# Patient Record
(demographics unavailable — no encounter records)

---

## 2025-01-03 NOTE — PHYSICAL EXAM
[No Acute Distress] : no acute distress [Well Nourished] : well nourished [Well Developed] : well developed [Well-Appearing] : well-appearing [Supple] : supple [No Respiratory Distress] : no respiratory distress  [No Accessory Muscle Use] : no accessory muscle use [Clear to Auscultation] : lungs were clear to auscultation bilaterally [Normal Rate] : normal rate  [Regular Rhythm] : with a regular rhythm [Normal S1, S2] : normal S1 and S2 [No Murmur] : no murmur heard [No Edema] : there was no peripheral edema [Normal Gait] : normal gait [Normal Affect] : the affect was normal [de-identified] : left mid-back with 1-2 cm area of firmness under skin with mild elevation and slight peeling of skin, without erythema or warmth, on palpation was able to express small amount of white pus from central region and thicker white cheesy material more medially from indurated area

## 2025-01-03 NOTE — HISTORY OF PRESENT ILLNESS
[FreeTextEntry1] : f/up visit [de-identified] : 67 yo female with h/o as below including HTN and preDM here for f/up visit. Trying to walk almost every day, at least 3-4 times/week, trying to watch what she eats.  Difficulty losing weight. Breakfast oatmeal or toast, fruits, lunch salads, doesn't have much meat or chicken, dinner beans, rice, potatoes, during the day will snack on chips, cookies.   Checks bp on occasion, usually 130/70.   Has boil on left lower back, did drain pus at one point but still may have some fluid in it.  Wondering if needs antibiotics. Got flu shot this year and covid booster. Takes famotidine on occasion, sometimes has SOB when talking and famotidine will help.  Not wheezing.  No longer on inhaler. Doesn't get SOB with exertion.  Has had SOB many times before, comes with winter and then will go away.

## 2025-01-03 NOTE — ASSESSMENT
[FreeTextEntry1] : 69 yo female with h/o as above including HTN, preDM, obesity here for f/up visit. 1.  CV - bp at goal, c/w current regimen 2  Endo - on metformin for preDM; counseled on diet changes, suggested weight management team to help with obesity, insurance didn't cover glp1 previously 3.  Derm - suspect cyst left-mid back (possibly recent inflamed), now reportedly much improved, would avoid neosporin and suggested warm soaks to continue to promote drainage, if worsening symptoms to call back but would hold on abx for now as no evidence of infection; referred to plastic surgeon for possible resection 4.  RTO 6 months cpe

## 2025-01-03 NOTE — PHYSICAL EXAM
[No Acute Distress] : no acute distress [Well Nourished] : well nourished [Well Developed] : well developed [Well-Appearing] : well-appearing [Supple] : supple [No Respiratory Distress] : no respiratory distress  [No Accessory Muscle Use] : no accessory muscle use [Clear to Auscultation] : lungs were clear to auscultation bilaterally [Normal Rate] : normal rate  [Regular Rhythm] : with a regular rhythm [Normal S1, S2] : normal S1 and S2 [No Murmur] : no murmur heard [No Edema] : there was no peripheral edema [Normal Gait] : normal gait [Normal Affect] : the affect was normal [de-identified] : left mid-back with 1-2 cm area of firmness under skin with mild elevation and slight peeling of skin, without erythema or warmth, on palpation was able to express small amount of white pus from central region and thicker white cheesy material more medially from indurated area

## 2025-01-03 NOTE — ASSESSMENT
[FreeTextEntry1] : 67 yo female with h/o as above including HTN, preDM, obesity here for f/up visit. 1.  CV - bp at goal, c/w current regimen 2  Endo - on metformin for preDM; counseled on diet changes, suggested weight management team to help with obesity, insurance didn't cover glp1 previously 3.  Derm - suspect cyst left-mid back (possibly recent inflamed), now reportedly much improved, would avoid neosporin and suggested warm soaks to continue to promote drainage, if worsening symptoms to call back but would hold on abx for now as no evidence of infection; referred to plastic surgeon for possible resection 4.  RTO 6 months cpe

## 2025-01-03 NOTE — HISTORY OF PRESENT ILLNESS
[FreeTextEntry1] : f/up visit [de-identified] : 69 yo female with h/o as below including HTN and preDM here for f/up visit. Trying to walk almost every day, at least 3-4 times/week, trying to watch what she eats.  Difficulty losing weight. Breakfast oatmeal or toast, fruits, lunch salads, doesn't have much meat or chicken, dinner beans, rice, potatoes, during the day will snack on chips, cookies.   Checks bp on occasion, usually 130/70.   Has boil on left lower back, did drain pus at one point but still may have some fluid in it.  Wondering if needs antibiotics. Got flu shot this year and covid booster. Takes famotidine on occasion, sometimes has SOB when talking and famotidine will help.  Not wheezing.  No longer on inhaler. Doesn't get SOB with exertion.  Has had SOB many times before, comes with winter and then will go away.

## 2025-07-05 NOTE — PHYSICAL EXAM
[No Acute Distress] : no acute distress [Well Nourished] : well nourished [Well Developed] : well developed [Well-Appearing] : well-appearing [PERRL] : pupils equal round and reactive to light [Normal Oropharynx] : the oropharynx was normal [EOMI] : extraocular movements intact [Normal TMs] : both tympanic membranes were normal [No Lymphadenopathy] : no lymphadenopathy [Supple] : supple [Thyroid Normal, No Nodules] : the thyroid was normal and there were no nodules present [No Respiratory Distress] : no respiratory distress  [No Accessory Muscle Use] : no accessory muscle use [Clear to Auscultation] : lungs were clear to auscultation bilaterally [Normal Rate] : normal rate  [Regular Rhythm] : with a regular rhythm [Normal S1, S2] : normal S1 and S2 [No Murmur] : no murmur heard [No Carotid Bruits] : no carotid bruits [Pedal Pulses Present] : the pedal pulses are present [No Edema] : there was no peripheral edema [Normal Appearance] : normal in appearance [No Nipple Discharge] : no nipple discharge [No Axillary Lymphadenopathy] : no axillary lymphadenopathy [Soft] : abdomen soft [Non Tender] : non-tender [Non-distended] : non-distended [No Masses] : no abdominal mass palpated [No HSM] : no HSM [Normal Bowel Sounds] : normal bowel sounds [Normal Supraclavicular Nodes] : no supraclavicular lymphadenopathy [Normal Axillary Nodes] : no axillary lymphadenopathy [Normal Posterior Cervical Nodes] : no posterior cervical lymphadenopathy [Normal Anterior Cervical Nodes] : no anterior cervical lymphadenopathy [Normal Inguinal Nodes] : no inguinal lymphadenopathy [No Joint Swelling] : no joint swelling [No Rash] : no rash [Normal Gait] : normal gait [Normal Affect] : the affect was normal

## 2025-07-10 NOTE — HEALTH RISK ASSESSMENT
[Never] : Never [Patient reported mammogram was normal] : Patient reported mammogram was normal [Patient reported PAP Smear was normal] : Patient reported PAP Smear was normal [Patient reported bone density results were normal] : Patient reported bone density results were normal [Patient reported colonoscopy was normal] : Patient reported colonoscopy was normal [With Family] : lives with family [] :  [Fully functional (bathing, dressing, toileting, transferring, walking, feeding)] : Fully functional (bathing, dressing, toileting, transferring, walking, feeding) [Fully functional (using the telephone, shopping, preparing meals, housekeeping, doing laundry, using] : Fully functional and needs no help or supervision to perform IADLs (using the telephone, shopping, preparing meals, housekeeping, doing laundry, using transportation, managing medications and managing finances) [Smoke Detector] : smoke detector [Carbon Monoxide Detector] : carbon monoxide detector [Designated Healthcare Proxy] : Designated healthcare proxy [Relationship: ___] : Relationship: [unfilled] [Monthly or less (1 pt)] : Monthly or less (1 point) [1 or 2 (0 pts)] : 1 or 2 (0 points) [Never (0 pts)] : Never (0 points) [No] : In the past 12 months have you used drugs other than those required for medical reasons? No [No falls in past year] : Patient reported no falls in the past year [0] : 2) Feeling down, depressed, or hopeless: Not at all (0) [PHQ-2 Negative - No further assessment needed] : PHQ-2 Negative - No further assessment needed [None] : Patient does not have any barriers to medication adherence [Yes] : Reviewed medication list for presence of high-risk medications. 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

## 2025-07-10 NOTE — ASSESSMENT
[Vaccines Reviewed] : Immunizations reviewed today. Please see immunization details in the vaccine log within the immunization flowsheet.  [FreeTextEntry1] : 70 yo female with h/o as above including HTN, preDM, MAFLD, obesity, thyroid nodule, GERD here for CPE, concern for UTI, and for [facility] visit for cataract surgery. 1.  Preop - pt with no active cardiac conditions, good exercise tolerance without symptoms, nl ECG, RCRI 0, pt low risk candidate for low-risk procedure with no further w/up required prior to planned surgery and no contraindication to proceeding with planned surgery 2.   - UA in office +, will start macrobid empirically and send out ua/ucx 3.  CV - bp slightly elevated but improved over course of visit and acceptable but will monitor at future visits (previously under good control); check lipids 4.  Endo - rx thyroid US given to f/up nodule; dexa utd; check a1c and b12 on metformin (for preDM) 5.  GI - colonoscopy utd; MAFLD, will calculate fib-4 score when results come back, check hep A and B immunity 6.  Gyn - pap utd, rx mammo given as due 7.  HCM - check below labs; pt reports had pneumonia vaccine age 65 so she will find out which one she had; vaccines otherwise utd 8.  RTO 6 months f/up visit

## 2025-07-10 NOTE — REVIEW OF SYSTEMS
[Negative] : ENT [Fever] : no fever [Chills] : no chills [Fatigue] : no fatigue [Recent Change In Weight] : ~T no recent weight change [Chest Pain] : no chest pain [Palpitations] : no palpitations [Shortness Of Breath] : no shortness of breath [Cough] : no cough [Dyspnea on Exertion] : no dyspnea on exertion [Abdominal Pain] : no abdominal pain [Nausea] : no nausea [Constipation] : no constipation [Diarrhea] : diarrhea [Vomiting] : no vomiting [Heartburn] : no heartburn [Melena] : no melena [Vaginal Discharge] : no vaginal discharge [Skin Rash] : no skin rash [Headache] : no headache [Dizziness] : no dizziness [Fainting] : no fainting [Anxiety] : no anxiety [Depression] : no depression [Easy Bleeding] : no easy bleeding [Easy Bruising] : no easy bruising [FreeTextEntry2] : diet ok, not exercising as much [FreeTextEntry3] : see hpi [FreeTextEntry5] : can climb full flight of stairs without chest pain or shortness of breath, can walk up a hill but slight windedness (stable) [FreeTextEntry8] : see hpi [FreeTextEntry9] : see hpi [de-identified] : under control, manageable

## 2025-07-10 NOTE — HISTORY OF PRESENT ILLNESS
[FreeTextEntry1] : physical [de-identified] : 68 yo female with h/o as below here for CPE.  Also here for [facility] visit for cataract surgery. Developed right hip pain, wondering if should go to ortho, when walking will feel it. Had UTI for 3 weeks, tried taking azo, cranberry tablets, didn't get tested, has cloudy urine and foul odor and at end of voiding has a little pain. Otherwise feeling well.  Had noticed FSG was elevated to 130s fasting and up to 150s after meals so self-increased metformin to 2 tabs/day of 500 mg and FSG improved. No chest pain, no palpitations, no shortness of breath, no dizziness, no syncopal episodes.  Can climb a flight of stairs without chest pain or shortness of breath.  No bleeding or clotting disorders. Never had complications with anesthesia in the past.

## 2025-07-10 NOTE — ADDENDUM
[FreeTextEntry1] : 7/10/24:  Reviewed labs, nl except Ucx >100,000 Ecoli sensitive to macrobid (which pt was given), triglycerides 244 elevated to work on diet, a1c 5.8 slightly improved to work on diet/exercise, hep A nonimmune to consider vaccine, LDL acceptable, other labs nl, fib4 0.74 so can observe for now - mailed/messaged to pt.  Pt may proceed with planned surgery as above.

## 2025-07-10 NOTE — REVIEW OF SYSTEMS
[Negative] : ENT [Fever] : no fever [Chills] : no chills [Fatigue] : no fatigue [Recent Change In Weight] : ~T no recent weight change [Chest Pain] : no chest pain [Palpitations] : no palpitations [Shortness Of Breath] : no shortness of breath [Cough] : no cough [Dyspnea on Exertion] : no dyspnea on exertion [Abdominal Pain] : no abdominal pain [Nausea] : no nausea [Constipation] : no constipation [Diarrhea] : diarrhea [Vomiting] : no vomiting [Heartburn] : no heartburn [Melena] : no melena [Vaginal Discharge] : no vaginal discharge [Skin Rash] : no skin rash [Headache] : no headache [Dizziness] : no dizziness [Fainting] : no fainting [Anxiety] : no anxiety [Depression] : no depression [Easy Bleeding] : no easy bleeding [Easy Bruising] : no easy bruising [FreeTextEntry2] : diet ok, not exercising as much [FreeTextEntry3] : see hpi [FreeTextEntry5] : can climb full flight of stairs without chest pain or shortness of breath, can walk up a hill but slight windedness (stable) [FreeTextEntry8] : see hpi [FreeTextEntry9] : see hpi [de-identified] : under control, manageable

## 2025-07-10 NOTE — REVIEW OF SYSTEMS
[Negative] : ENT [Fever] : no fever [Chills] : no chills [Fatigue] : no fatigue [Recent Change In Weight] : ~T no recent weight change [Chest Pain] : no chest pain [Palpitations] : no palpitations [Shortness Of Breath] : no shortness of breath [Cough] : no cough [Dyspnea on Exertion] : no dyspnea on exertion [Abdominal Pain] : no abdominal pain [Nausea] : no nausea [Constipation] : no constipation [Diarrhea] : diarrhea [Vomiting] : no vomiting [Heartburn] : no heartburn [Melena] : no melena [Vaginal Discharge] : no vaginal discharge [Skin Rash] : no skin rash [Headache] : no headache [Dizziness] : no dizziness [Fainting] : no fainting [Anxiety] : no anxiety [Depression] : no depression [Easy Bleeding] : no easy bleeding [Easy Bruising] : no easy bruising [FreeTextEntry2] : diet ok, not exercising as much [FreeTextEntry3] : see hpi [FreeTextEntry5] : can climb full flight of stairs without chest pain or shortness of breath, can walk up a hill but slight windedness (stable) [FreeTextEntry8] : see hpi [FreeTextEntry9] : see hpi [de-identified] : under control, manageable

## 2025-07-10 NOTE — PAST MEDICAL HISTORY
[Postmenopausal] : history of menopause having occurred [Menopause Age____] : age at menopause was [unfilled] [Total Preg ___] : G: [unfilled] [Full Term ___] : Full Term: [unfilled]  [Premature ___] : Premature: [unfilled] [FreeTextEntry1] : no vaginal bleeding, no uterine or ovarian abnl, no abnl paps, no concerns for STIs

## 2025-07-10 NOTE — ASSESSMENT
[Vaccines Reviewed] : Immunizations reviewed today. Please see immunization details in the vaccine log within the immunization flowsheet.  [FreeTextEntry1] : 70 yo female with h/o as above including HTN, preDM, MAFLD, obesity, thyroid nodule, GERD here for CPE, concern for UTI, and for [facility] visit for cataract surgery. 1.  Preop - pt with no active cardiac conditions, good exercise tolerance without symptoms, nl ECG, RCRI 0, pt low risk candidate for low-risk procedure with no further w/up required prior to planned surgery and no contraindication to proceeding with planned surgery 2.   - UA in office +, will start macrobid empirically and send out ua/ucx 3.  CV - bp slightly elevated but improved over course of visit and acceptable but will monitor at future visits (previously under good control); check lipids 4.  Endo - rx thyroid US given to f/up nodule; dexa utd; check a1c and b12 on metformin (for preDM) 5.  GI - colonoscopy utd; MAFLD, will calculate fib-4 score when results come back, check hep A and B immunity 6.  Gyn - pap utd, rx mammo given as due 7.  HCM - check below labs; pt reports had pneumonia vaccine age 65 so she will find out which one she had; vaccines otherwise utd 8.  RTO 6 months f/up visit

## 2025-07-10 NOTE — HEALTH RISK ASSESSMENT
[Never] : Never [Patient reported mammogram was normal] : Patient reported mammogram was normal [Patient reported PAP Smear was normal] : Patient reported PAP Smear was normal [Patient reported bone density results were normal] : Patient reported bone density results were normal [Patient reported colonoscopy was normal] : Patient reported colonoscopy was normal [With Family] : lives with family [] :  [Fully functional (bathing, dressing, toileting, transferring, walking, feeding)] : Fully functional (bathing, dressing, toileting, transferring, walking, feeding) [Fully functional (using the telephone, shopping, preparing meals, housekeeping, doing laundry, using] : Fully functional and needs no help or supervision to perform IADLs (using the telephone, shopping, preparing meals, housekeeping, doing laundry, using transportation, managing medications and managing finances) [Smoke Detector] : smoke detector [Carbon Monoxide Detector] : carbon monoxide detector [Designated Healthcare Proxy] : Designated healthcare proxy [Relationship: ___] : Relationship: [unfilled] [Monthly or less (1 pt)] : Monthly or less (1 point) [1 or 2 (0 pts)] : 1 or 2 (0 points) [Never (0 pts)] : Never (0 points) [No] : In the past 12 months have you used drugs other than those required for medical reasons? No [No falls in past year] : Patient reported no falls in the past year [0] : 2) Feeling down, depressed, or hopeless: Not at all (0) [PHQ-2 Negative - No further assessment needed] : PHQ-2 Negative - No further assessment needed [None] : Patient does not have any barriers to medication adherence [Yes] : Reviewed medication list for presence of high-risk medications. 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

## 2025-07-10 NOTE — HISTORY OF PRESENT ILLNESS
[FreeTextEntry1] : physical [de-identified] : 70 yo female with h/o as below here for CPE.  Also here for [facility] visit for cataract surgery. Developed right hip pain, wondering if should go to ortho, when walking will feel it. Had UTI for 3 weeks, tried taking azo, cranberry tablets, didn't get tested, has cloudy urine and foul odor and at end of voiding has a little pain. Otherwise feeling well.  Had noticed FSG was elevated to 130s fasting and up to 150s after meals so self-increased metformin to 2 tabs/day of 500 mg and FSG improved. No chest pain, no palpitations, no shortness of breath, no dizziness, no syncopal episodes.  Can climb a flight of stairs without chest pain or shortness of breath.  No bleeding or clotting disorders. Never had complications with anesthesia in the past.

## 2025-07-10 NOTE — HISTORY OF PRESENT ILLNESS
[FreeTextEntry1] : physical [de-identified] : 70 yo female with h/o as below here for CPE.  Also here for [facility] visit for cataract surgery. Developed right hip pain, wondering if should go to ortho, when walking will feel it. Had UTI for 3 weeks, tried taking azo, cranberry tablets, didn't get tested, has cloudy urine and foul odor and at end of voiding has a little pain. Otherwise feeling well.  Had noticed FSG was elevated to 130s fasting and up to 150s after meals so self-increased metformin to 2 tabs/day of 500 mg and FSG improved. No chest pain, no palpitations, no shortness of breath, no dizziness, no syncopal episodes.  Can climb a flight of stairs without chest pain or shortness of breath.  No bleeding or clotting disorders. Never had complications with anesthesia in the past.

## 2025-07-10 NOTE — HEALTH RISK ASSESSMENT
[Never] : Never [Patient reported mammogram was normal] : Patient reported mammogram was normal [Patient reported PAP Smear was normal] : Patient reported PAP Smear was normal [Patient reported bone density results were normal] : Patient reported bone density results were normal [Patient reported colonoscopy was normal] : Patient reported colonoscopy was normal [With Family] : lives with family [] :  [Fully functional (bathing, dressing, toileting, transferring, walking, feeding)] : Fully functional (bathing, dressing, toileting, transferring, walking, feeding) [Fully functional (using the telephone, shopping, preparing meals, housekeeping, doing laundry, using] : Fully functional and needs no help or supervision to perform IADLs (using the telephone, shopping, preparing meals, housekeeping, doing laundry, using transportation, managing medications and managing finances) [Smoke Detector] : smoke detector [Carbon Monoxide Detector] : carbon monoxide detector [Designated Healthcare Proxy] : Designated healthcare proxy [Relationship: ___] : Relationship: [unfilled] [Monthly or less (1 pt)] : Monthly or less (1 point) [1 or 2 (0 pts)] : 1 or 2 (0 points) [Never (0 pts)] : Never (0 points) [No] : In the past 12 months have you used drugs other than those required for medical reasons? No [No falls in past year] : Patient reported no falls in the past year [0] : 2) Feeling down, depressed, or hopeless: Not at all (0) [PHQ-2 Negative - No further assessment needed] : PHQ-2 Negative - No further assessment needed [None] : Patient does not have any barriers to medication adherence [Yes] : Reviewed medication list for presence of high-risk medications. 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

## 2025-07-10 NOTE — ADDENDUM
[FreeTextEntry1] : 7/10/24:  Reviewed labs, nl except Ucx >100,000 Ecoli sensitive to macrobid (which pt was given), triglycerides 244 elevated to work on diet, a1c 5.8 slightly improved to work on diet/exercise, hep A nonimmune to consider vaccine, LDL acceptable, other labs nl, fib4 0.74 so can observe for now - mailed/messaged to pt.  Pt may proceed with planned surgery as above. 1950

## 2025-07-10 NOTE — ASSESSMENT
[Vaccines Reviewed] : Immunizations reviewed today. Please see immunization details in the vaccine log within the immunization flowsheet.  [FreeTextEntry1] : 70 yo female with h/o as above including HTN, preDM, MAFLD, obesity, thyroid nodule, GERD here for CPE, concern for UTI, and for [facility] visit for cataract surgery. 1.  Preop - pt with no active cardiac conditions, good exercise tolerance without symptoms, nl ECG, RCRI 0, pt low risk candidate for low-risk procedure with no further w/up required prior to planned surgery and no contraindication to proceeding with planned surgery 2.   - UA in office +, will start macrobid empirically and send out ua/ucx 3.  CV - bp slightly elevated but improved over course of visit and acceptable but will monitor at future visits (previously under good control); check lipids 4.  Endo - rx thyroid US given to f/up nodule; dexa utd; check a1c and b12 on metformin (for preDM) 5.  GI - colonoscopy utd; MAFLD, will calculate fib-4 score when results come back, check hep A and B immunity 6.  Gyn - pap utd, rx mammo given as due 7.  HCM - check below labs; pt reports had pneumonia vaccine age 65 so she will find out which one she had; vaccines otherwise utd 8.  RTO 6 months f/up visit